# Patient Record
(demographics unavailable — no encounter records)

---

## 2017-10-13 NOTE — TELEPHONE ENCOUNTER
This patient has not seen Dr. Pratt since 2014.  I called her, at his request, to see if she plans to schedule an appointment.  No answer, the call went to voice mail. I left a message asking Karley to return my call.  Dr. Pratt stated that if she is currently taking the pill that has been requested, and if she schedules an appointment, he would refill until the time of the appointment.  If she is currently not taking this pill, she would need an appointment prior to prescribing.

## 2017-10-16 RX ORDER — NORGESTIMATE AND ETHINYL ESTRADIOL 7DAYSX3 LO
KIT ORAL
Qty: 28 TABLET | Refills: 0 | OUTPATIENT
Start: 2017-10-16